# Patient Record
Sex: FEMALE | Race: ASIAN | NOT HISPANIC OR LATINO
[De-identification: names, ages, dates, MRNs, and addresses within clinical notes are randomized per-mention and may not be internally consistent; named-entity substitution may affect disease eponyms.]

---

## 2017-08-12 ENCOUNTER — HEALTH MAINTENANCE LETTER (OUTPATIENT)
Age: 42
End: 2017-08-12

## 2018-01-16 ENCOUNTER — OFFICE VISIT (OUTPATIENT)
Dept: FAMILY MEDICINE | Facility: CLINIC | Age: 43
End: 2018-01-16
Payer: COMMERCIAL

## 2018-01-16 VITALS
SYSTOLIC BLOOD PRESSURE: 100 MMHG | HEART RATE: 74 BPM | WEIGHT: 109.4 LBS | RESPIRATION RATE: 24 BRPM | TEMPERATURE: 98.2 F | OXYGEN SATURATION: 98 % | BODY MASS INDEX: 22.1 KG/M2 | DIASTOLIC BLOOD PRESSURE: 68 MMHG

## 2018-01-16 DIAGNOSIS — S76.812A STRAIN OF ILIOPSOAS MUSCLE, LEFT, INITIAL ENCOUNTER: Primary | ICD-10-CM

## 2018-01-16 RX ORDER — NAPROXEN 500 MG/1
500 TABLET ORAL 2 TIMES DAILY PRN
Qty: 30 TABLET | Refills: 1 | Status: SHIPPED | OUTPATIENT
Start: 2018-01-16 | End: 2022-06-08

## 2018-01-16 RX ORDER — CYCLOBENZAPRINE HCL 10 MG
TABLET ORAL
Qty: 30 TABLET | Refills: 0 | Status: SHIPPED | OUTPATIENT
Start: 2018-01-16 | End: 2022-06-08

## 2018-01-16 NOTE — MR AVS SNAPSHOT
After Visit Summary   1/16/2018    Shawna Otto    MRN: [id number]           Patient Information     Date Of Birth          1975        Visit Information        Provider Department      1/16/2018 4:10 PM David Herrmann DO [facility]        Today's Diagnoses     Strain of iliopsoas muscle, left, initial encounter    -  1    Sprain of neck, initial encounter          Care Instructions      Exercises to Strengthen Your Lower Back  Strong lower back and abdominal muscles work together to support your spine. The exercises below will help strengthen the lower back. It is important that you begin exercising slowly and increase levels gradually.  Always begin any exercise program with stretching. If you feel pain while doing any of these exercises, stop and talk to your doctor about a more specific exercise program that better suits your condition.   Low back stretch  The point of stretching is to make you more flexible and increase your range of motion. Stretch only as much as you are able. Stretch slowly. Do not push your stretch to the limit. If at any point you feel pain while stretching, this is your (temporary) limit.    Lie on your back with your knees bent and both feet on the ground.    Slowly raise your left knee to your chest as you flatten your lower back against the floor. Hold for 5 seconds.    Relax and repeat the exercise with your right knee.    Do 10 of these exercises for each leg.    Repeat hugging both knees to your chest at the same time.  Building lower back strength  Start your exercise routine with 10 to 30 minutes a day, 1 to 3 times a day.  Initial exercises  Lying on your back:  1. Ankle pumps: Move your foot up and down, towards your head, and then away. Repeat 10 times with each foot.  2. Heel slides: Slowly bend your knee, drawing the heel of your foot towards you. Then slide your heel/foot from you, straightening your knee. Do not lift your foot off the floor (this is not  a leg lift).  3. Abdominal contraction: Bend your knees and put your hands on your stomach. Tighten your stomach muscles. Hold for 5 seconds, then relax. Repeat 10 times.  4. Straight leg raise: Bend one leg at the knee and keep the other leg straight. Tighten your stomach muscles. Slowly lift your straight leg 6 to 12 inches off the floor and hold for up to 5 seconds. Repeat 10 times on each side.  Standin. Wall squats: Stand with your back against the wall. Move your feet about 12 inches away from the wall. Tighten your stomach muscles, and slowly bend your knees until they are at about a 45 degree angle. Do not go down too far. Hold about 5 seconds. Then slowly return to your starting position. Repeat 10 times.  2. Heel raises: Stand facing the wall. Slowly raise the heels of your feet up and down, while keeping your toes on the floor. If you have trouble balancing, you can touch the wall with your hands. Repeat 10 times.  More advanced exercises  When you feel comfortable enough, try these exercises.  1. Kneeling lumbar extension: Begin on your hands and knees. At the same time, raise and straighten your right arm and left leg until they are parallel to the ground. Hold for 2 seconds and come back slowly to a starting position. Repeat with left arm and right leg, alternating 10 times.  2. Prone lumbar extension: Lie face down, arms extended overhead, palms on the floor. At the same time, raise your right arm and left leg as high as comfortably possible. Hold for 10 seconds and slowly return to start. Repeat with left arm and right leg, alternating 10 times. Gradually build up to 20 times. (Advanced: Repeat this exercise raising both arms and both legs a few inches off the floor at the same time. Hold for 5 seconds and release.)  3. Pelvic tilt: Lie on the floor on your back with your knees bent at 90 degrees. Your feet should be flat on the floor. Inhale, exhale, then slowly contract your abdominal muscles  bringing your navel toward your spine. Let your pelvis rock back until your lower back is flat on the floor. Hold for 10 seconds while breathing smoothly.  4. Abdominal crunch: Perform a pelvic tilt (above) flattening your lower back against the floor. Holding the tension in your abdominal muscles, take another breath and raise your shoulder blades off the ground (this is not a full sit-up). Keep your head in line with your body (don t bend your neck forward). Hold for 2 seconds, then slowly lower.  Date Last Reviewed: 6/1/2016 2000-2017 [employer]. [address] [place] [address]. All rights reserved. This information is not intended as a substitute for professional medical care. Always follow your healthcare professional's instructions.                Follow-ups after your visit        Who to contact     Please call your clinic at [phone] to:    Ask questions about your health    Make or cancel appointments    Discuss your medicines    Learn about your test results    Speak to your doctor   If you have compliments or concerns about an experience at your clinic, or if you wish to file a complaint, please contact [facility] Patient Relations at [phone] or email us at [email or web address]         Additional Information About Your Visit        [employer] Information     [employer] gives you secure access to your electronic health record. If you see a primary care provider, you can also send messages to your care team and make appointments. If you have questions, please call your primary care clinic.  If you do not have a primary care provider, please call [phone] and they will assist you.      [employer] is an electronic gateway that provides easy, online access to your medical records. With [employer], you can request a clinic appointment, read your test results, renew a prescription or communicate with your care team.     To access your existing  account, please contact your [facility] or call [phone] for assistance.        Care EveryWhere ID     This is your Care EveryWhere ID. This could be used by other organizations to access your [place] medical records  [id number]        Your Vitals Were     Pulse Temperature Respirations Last Period Pulse Oximetry BMI (Body Mass Index)    74 98.2  F (36.8  C) (Oral) 24 01/03/2018 (Approximate) 98% 22.1 kg/m2       Blood Pressure from Last 3 Encounters:   01/16/18 100/68   01/26/16 119/77   12/21/15 107/72    Weight from Last 3 Encounters:   01/16/18 109 lb 6.4 oz (49.6 kg)   12/21/15 103 lb 6.4 oz (46.9 kg)   12/16/15 104 lb 6.4 oz (47.4 kg)              Today, you had the following     No orders found for display         Today's Medication Changes          These changes are accurate as of: 1/16/18  4:29 PM.  If you have any questions, ask your nurse or doctor.               Start taking these medicines.        Dose/Directions    naproxen 500 MG tablet   Commonly known as:  NAPROSYN   Used for:  Strain of iliopsoas muscle, left, initial encounter   Started by:  David Herrmann DO        Dose:  500 mg   Take 1 tablet (500 mg) by mouth 2 times daily as needed for moderate pain   Quantity:  30 tablet   Refills:  1         These medicines have changed or have updated prescriptions.        Dose/Directions    cyclobenzaprine 10 MG tablet   Commonly known as:  FLEXERIL   This may have changed:  additional instructions   Used for:  Strain of iliopsoas muscle, left, initial encounter   Changed by:  David Herrmann DO        1 po qhs, then 1/2 tab po q 6-8 hours during the day for back pain   Quantity:  30 tablet   Refills:  0            Where to get your medicines      [facility] [place], [place] - [address], [place] [address]     Phone:  [phone]     cyclobenzaprine 10 MG tablet    naproxen 500 MG tablet                 Primary Care Provider Office Phone # Fax #    Bartolome Calhoun [phone]       [address] [facility] [address]        Equal Access to Services     LINDA GIL : Hadii angel ku hadcharleyo Sobennieali, waaxda luqadaha, qaybta kaalmada adeegyada, nabil geiger laLuiseitan morales. So [facility] [phone].    ATENCIÓN: Si habla español, tiene a lowe disposición servicios gratuitos de asistencia lingüística. Llame al [phone].    We comply with applicable federal civil rights laws and [place] laws. We do not discriminate on the basis of race, color, national origin, age, disability, sex, sexual orientation, or gender identity.            Thank you!     Thank you for choosing [facility]  for your care. Our goal is always to provide you with excellent care. Hearing back from our patients is one way we can continue to improve our services. Please take a few minutes to complete the written survey that you may receive in the mail after your visit with us. Thank you!             Your Updated Medication List - Protect others around you: Learn how to safely use, store and throw away your medicines at www.disposemymeds.org.          This list is accurate as of: 1/16/18  4:29 PM.  Always use your most recent med list.                   Brand Name Dispense Instructions for use Diagnosis    cyclobenzaprine 10 MG tablet    FLEXERIL    30 tablet    1 po qhs, then 1/2 tab po q 6-8 hours during the day for back pain    Strain of iliopsoas muscle, left, initial encounter       naproxen 500 MG tablet    NAPROSYN    30 tablet    Take 1 tablet (500 mg) by mouth 2 times daily as needed for moderate pain    Strain of iliopsoas muscle, left, initial encounter       SUMAtriptan 50 MG tablet    IMITREX    9 tablet    Take 1 tablet (50 mg) by mouth at onset of headache for migraine May repeat dose in 2 hours.  Do not exceed 200 mg in 24 hours    Migraine without aura and without status migrainosus, not  intractable

## 2018-01-16 NOTE — PROGRESS NOTES
Family History   Problem Relation Age of Onset     CANCER Mother      Liver cancer      Myocardial Infarction Father      HEART DISEASE Father      DIABETES No family hx of      Coronary Artery Disease No family hx of      Social History     Social History     Marital status:      Spouse name: N/A     Number of children: N/A     Years of education: N/A     Social History Main Topics     Smoking status: Never Smoker     Smokeless tobacco: Never Used     Alcohol use No     Drug use: No     Sexual activity: Not Asked     Other Topics Concern     None     Social History Narrative       There are no exam notes on file for this visit.  Chief Complaint   Patient presents with     Back Pain     have lower back pain, this is the third time, had once before Christmas, another one during Christmas, and this time started on Sunday 01/14/2018 per patient.        Blood pressure 100/68, pulse 74, temperature 98.2  F (36.8  C), temperature source Oral, resp. rate 24, weight 109 lb 6.4 oz (49.6 kg), last menstrual period 01/03/2018, SpO2 98 %.      S:  Patient reports back pain started about 2 weeks before christmas, happened 2 more times after that. Inciting event is none- patient woke up with. Described as sharp pain in back. Pain is intermittent. Excerbated by walking, relieved with sitting. No Numbness, no tingling, no incontinence, and no radiation. Sensation is intact in groin area. 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    O:  /68  Pulse 74  Temp 98.2  F (36.8  C) (Oral)  Resp 24  Wt 109 lb 6.4 oz (49.6 kg)  LMP 01/03/2018 (Approximate)  SpO2 98%  BMI 22.1 kg/m2    General Appearance: healthy ,alert, active, no distress  Head/Neck: Normocephalic. No masses, lesions, tenderness or abnormalities  Eyes: conjunctiva clear, PERRL,EOM intact  Ears: External ears normal. Canals  clear. TM's normal.  Heart: regular rate and rhythm with normal S1, S2; no murmur, rub or gallops  Lungs: clear to auscultation, with normal respiratory effort  Extremities: no peripheral edema, peripheral pulses normal  Back:Stooped Gait, Full active ROM. Pain with extension, no bony point tenderness. Tender to deep palpation on L1-L2 Paraspinal muscles, Negative straight leg, tosha. Sensation intact, Strength 5/5, Patellar reflex 2/4. No CVA tenderness  Mental Status: cooperative, normal affect, no gross thought process defects during casual conversation.    A/P:  1. Strain of iliopsoas muscle, left, initial encounter  Likely iliopsoas strain. no worrisome symptoms. Discussed when to call us or go to ED including increased pain, numbness, tingling, rash. Unlikely to be meningitis, fracture, spondy although on differential. Rx flexaril, naproxen. Discussed PT but patient declined. F/u in 2 weeks or sooner if worsening pain.  - cyclobenzaprine (FLEXERIL) 10 MG tablet; 1 po qhs, then 1/2 tab po q 6-8 hours during the day for back pain  Dispense: 30 tablet; Refill: 0  - naproxen (NAPROSYN) 500 MG tablet; Take 1 tablet (500 mg) by mouth 2 times daily as needed for moderate pain  Dispense: 30 tablet; Refill: 1  - If persists in 2 weeks, can refer to PT    David Herrmann  Family Medicine Resident PGY3

## 2018-01-16 NOTE — PATIENT INSTRUCTIONS
Exercises to Strengthen Your Lower Back  Strong lower back and abdominal muscles work together to support your spine. The exercises below will help strengthen the lower back. It is important that you begin exercising slowly and increase levels gradually.  Always begin any exercise program with stretching. If you feel pain while doing any of these exercises, stop and talk to your doctor about a more specific exercise program that better suits your condition.   Low back stretch  The point of stretching is to make you more flexible and increase your range of motion. Stretch only as much as you are able. Stretch slowly. Do not push your stretch to the limit. If at any point you feel pain while stretching, this is your (temporary) limit.    Lie on your back with your knees bent and both feet on the ground.    Slowly raise your left knee to your chest as you flatten your lower back against the floor. Hold for 5 seconds.    Relax and repeat the exercise with your right knee.    Do 10 of these exercises for each leg.    Repeat hugging both knees to your chest at the same time.  Building lower back strength  Start your exercise routine with 10 to 30 minutes a day, 1 to 3 times a day.  Initial exercises  Lying on your back:  1. Ankle pumps: Move your foot up and down, towards your head, and then away. Repeat 10 times with each foot.  2. Heel slides: Slowly bend your knee, drawing the heel of your foot towards you. Then slide your heel/foot from you, straightening your knee. Do not lift your foot off the floor (this is not a leg lift).  3. Abdominal contraction: Bend your knees and put your hands on your stomach. Tighten your stomach muscles. Hold for 5 seconds, then relax. Repeat 10 times.  4. Straight leg raise: Bend one leg at the knee and keep the other leg straight. Tighten your stomach muscles. Slowly lift your straight leg 6 to 12 inches off the floor and hold for up to 5 seconds. Repeat 10 times on each  side.  Standin. Wall squats: Stand with your back against the wall. Move your feet about 12 inches away from the wall. Tighten your stomach muscles, and slowly bend your knees until they are at about a 45 degree angle. Do not go down too far. Hold about 5 seconds. Then slowly return to your starting position. Repeat 10 times.  2. Heel raises: Stand facing the wall. Slowly raise the heels of your feet up and down, while keeping your toes on the floor. If you have trouble balancing, you can touch the wall with your hands. Repeat 10 times.  More advanced exercises  When you feel comfortable enough, try these exercises.  1. Kneeling lumbar extension: Begin on your hands and knees. At the same time, raise and straighten your right arm and left leg until they are parallel to the ground. Hold for 2 seconds and come back slowly to a starting position. Repeat with left arm and right leg, alternating 10 times.  2. Prone lumbar extension: Lie face down, arms extended overhead, palms on the floor. At the same time, raise your right arm and left leg as high as comfortably possible. Hold for 10 seconds and slowly return to start. Repeat with left arm and right leg, alternating 10 times. Gradually build up to 20 times. (Advanced: Repeat this exercise raising both arms and both legs a few inches off the floor at the same time. Hold for 5 seconds and release.)  3. Pelvic tilt: Lie on the floor on your back with your knees bent at 90 degrees. Your feet should be flat on the floor. Inhale, exhale, then slowly contract your abdominal muscles bringing your navel toward your spine. Let your pelvis rock back until your lower back is flat on the floor. Hold for 10 seconds while breathing smoothly.  4. Abdominal crunch: Perform a pelvic tilt (above) flattening your lower back against the floor. Holding the tension in your abdominal muscles, take another breath and raise your shoulder blades off the ground (this is not a full sit-up).  Keep your head in line with your body (don t bend your neck forward). Hold for 2 seconds, then slowly lower.  Date Last Reviewed: 6/1/2016 2000-2017 [employer]. [address] [place] [address], [place] [address]. All rights reserved. This information is not intended as a substitute for professional medical care. Always follow your healthcare professional's instructions.

## 2018-01-16 NOTE — PROGRESS NOTES
Preceptor attestation:  Patient seen and discussed with the resident. Assessment and plan reviewed with resident and agreed upon.  Supervising physician: Jarrod Ramon  [facility]

## 2018-01-25 ENCOUNTER — OFFICE VISIT (OUTPATIENT)
Dept: FAMILY MEDICINE | Facility: CLINIC | Age: 43
End: 2018-01-25
Payer: COMMERCIAL

## 2018-01-25 VITALS
TEMPERATURE: 98.3 F | BODY MASS INDEX: 21.57 KG/M2 | SYSTOLIC BLOOD PRESSURE: 103 MMHG | WEIGHT: 106.8 LBS | OXYGEN SATURATION: 99 % | HEART RATE: 76 BPM | DIASTOLIC BLOOD PRESSURE: 71 MMHG

## 2018-01-25 DIAGNOSIS — N88.8 NABOTHIAN CYST: ICD-10-CM

## 2018-01-25 DIAGNOSIS — Z00.00 ROUTINE HEALTH MAINTENANCE: Primary | ICD-10-CM

## 2018-01-25 NOTE — MR AVS SNAPSHOT
After Visit Summary   1/25/2018    Shawna Otto    MRN: [id number]           Patient Information     Date Of Birth          1975        Visit Information        Provider Department      1/25/2018 4:10 PM Irina De Los Santos MD [facility]        Today's Diagnoses     Routine health maintenance    -  1    Nabothian cyst          Care Instructions    Nabothian cysts -- Nabothian cysts (also called mucinous retention cysts, epithelial inclusion cysts) are discrete cystic structures that form when a cleft of columnar epithelium becomes covered with squamous cells and the columnar cells continue to secrete mucoid material. The cysts vary from microscopic to several centimeters in size; the larger ones project above the surface of the portio. They may appear translucent or opaque. Nabothian cysts may occur following minor trauma or childbirth.  The only indication for treatment is relief from pain or a bothersome feeling of fullness in the vagina. Ablation of the cyst using electrocautery is the usual approach; however, if the diagnosis is uncertain, excision to evaluate histopathology is advised. The main disadvantage to surgical treatment is the possibility of causing scar tissue, which itself can lead to dyspareunia.            Follow-ups after your visit        Follow-up notes from your care team     Return in about 1 year (around 1/25/2019) for Physical Exam.      Who to contact     Please call your clinic at [phone] to:    Ask questions about your health    Make or cancel appointments    Discuss your medicines    Learn about your test results    Speak to your doctor   If you have compliments or concerns about an experience at your clinic, or if you wish to file a complaint, please contact [facility] Patient Relations at [phone] or email us at [email or web address]         Additional Information About Your Visit        MyChart Information      [employer] gives you secure access to your electronic health record. If you see a primary care provider, you can also send messages to your care team and make appointments. If you have questions, please call your primary care clinic.  If you do not have a primary care provider, please call [phone] and they will assist you.      [employer] is an electronic gateway that provides easy, online access to your medical records. With [employer], you can request a clinic appointment, read your test results, renew a prescription or communicate with your care team.     To access your existing account, please contact your [facility] or call [phone] for assistance.        Care EveryWhere ID     This is your Care EveryWhere ID. This could be used by other organizations to access your [place] medical records  [id number]        Your Vitals Were     Pulse Temperature Last Period Pulse Oximetry BMI (Body Mass Index)       76 98.3  F (36.8  C) (Oral) 01/03/2018 (Approximate) 99% 21.57 kg/m2        Blood Pressure from Last 3 Encounters:   01/25/18 103/71   01/16/18 100/68   01/26/16 119/77    Weight from Last 3 Encounters:   01/25/18 106 lb 12.8 oz (48.4 kg)   01/16/18 109 lb 6.4 oz (49.6 kg)   12/21/15 103 lb 6.4 oz (46.9 kg)              We Performed the Following     GYN Cytology ([facility])        Primary Care Provider Office Phone # Fax #    Bartolome Haile Lj,  [phone]       [address] [place] [address]        Equal Access to Services     LINDA GIL : Hadii aad ku hadasho Soomaali, waaxda luqadaha, qaybta kaalmada adeegyada, nabil morales . So [facility] [phone].    ATENCIÓN: Si habla español, tiene a lowe disposición servicios gratuitos de asistencia lingüística. Llame al [phone].    We comply with applicable federal civil rights laws and [place] laws. We do not discriminate on the basis of race, color, national origin, age,  disability, sex, sexual orientation, or gender identity.            Thank you!     Thank you for choosing [facility]  for your care. Our goal is always to provide you with excellent care. Hearing back from our patients is one way we can continue to improve our services. Please take a few minutes to complete the written survey that you may receive in the mail after your visit with us. Thank you!             Your Updated Medication List - Protect others around you: Learn how to safely use, store and throw away your medicines at www.disposemymeds.org.          This list is accurate as of 1/25/18  4:46 PM.  Always use your most recent med list.                   Brand Name Dispense Instructions for use Diagnosis    cyclobenzaprine 10 MG tablet    FLEXERIL    30 tablet    1 po qhs, then 1/2 tab po q 6-8 hours during the day for back pain    Strain of iliopsoas muscle, left, initial encounter       naproxen 500 MG tablet    NAPROSYN    30 tablet    Take 1 tablet (500 mg) by mouth 2 times daily as needed for moderate pain    Strain of iliopsoas muscle, left, initial encounter

## 2018-01-25 NOTE — PROGRESS NOTES
Preceptor attestation:  Patient seen and discussed with the resident. Assessment and plan reviewed with resident and agreed upon.  Was asked to look at cervix--2 nabothian cysts seen at 10 and 11 o'clock.  Supervising physician: Esau Larsen  [facility]

## 2018-01-25 NOTE — LETTER
January 31, 2018      May Yang [address] [place] [address]        Dear May,    Thanks for coming to [facility]! Your pap smear was normal. You are due for your next pap smear in 5 years (1/25/2023) per routine guidelines. Please call with any questions.     Sincerely,     Irina De Los Santos MD     Please see below for your test results.    Resulted Orders   GYN Cytology ([facility])   Result Value Ref Range    Lab AP Case Report SEE RESULTS BELOW       Comment:      Gynecologic Cytology Report                       Case: [id number]                                     Authorizing Provider:  Irina De Los Santos MD       Collected:             01/25/2018 1648              First Screen:          FRANK Nuno   Received:              01/26/2018 0933                                     (ASCP)                                                                         Rescreen:              FRANK Chester                                                                                  (ASCP)                                                                         Specimen:    SUREPATH PAP, SCREENING, Endocervical/cervical                                               Lab AP Gyn Interpretation SEE RESULTS BELOW       Comment:      Negative for squamous intraepithelial lesion or malignancy  Electronically signed by FRANK Chester (ASCP) on 1/30/2018 at  3:11 PM      Lab AP Malignant? Normal Normal    Specimen adequacy:       Satisfactory for evaluation, endocervical/transformation zone component absent    HPV Reflex? Yes regardless of result     High Risk? No     Last Mens Period 12/17/17     Lab AP Abnormal Bleeding No     Lab AP Patient Status tubal ligation     Lab AP Birth Control/Hormones None     Lab AP Previous Normal unknown     Lab AP Previous Abnl none     Lab AP Cervical Appearance nabtholin cyst at 11 o'clock     Narrative    Test performed by:  [facility]  [address] [place] [address]   HPV Burt PCR ([employer])   Result Value Ref Range    HPV Source SurePath     HPV 16 DNA Negative NEG    HPV 18 DNA Negative NEG    Other HR HPV Negative NEG    Final Diagnosis SEE NOTES       Comment:      This patient's sample is negative for HPV DNA.  This test was developed and its performance characteristics determined by the  [facility], Molecular Diagnostics Laboratory. It  has not been cleared or approved by the FDA. The laboratory is regulated under  CLIA as qualified to perform high-complexity testing. This test is used for  clinical purposes. It should not be regarded as investigational or for  research.  (Note)  METHODOLOGY:  The Roche gerald 4800 system uses automated extraction,  simultaneous amplification of HPV (L1 region) and beta-globin,  followed by  real time detection of fluorescent labeled HPV and beta  globin using specific oligonucleotide probes . The test specifically  identifies types HPV 16 DNA and HPV 18 DNA while concurrently  detecting the rest of the high risk types (31, 33, 35, 39, 45, 51,  52, 56, 58, 59, 66 or 68).     COMMENTS:  This test is not intended for use as a screening device  for women under age 30 with normal cervical   cytology.  Results should  be correlated with cytologic and histologic findings. Close clinical  followup is recommended.         Specimen Description Cervical Cells       Comment:      PERFORMED AT  [facility] [address] [address]       Narrative    Test performed by:  [facility]  [address], [place] [address]

## 2018-01-25 NOTE — PATIENT INSTRUCTIONS
Nabothian cysts -- Nabothian cysts (also called mucinous retention cysts, epithelial inclusion cysts) are discrete cystic structures that form when a cleft of columnar epithelium becomes covered with squamous cells and the columnar cells continue to secrete mucoid material. The cysts vary from microscopic to several centimeters in size; the larger ones project above the surface of the portio. They may appear translucent or opaque. Nabothian cysts may occur following minor trauma or childbirth.  The only indication for treatment is relief from pain or a bothersome feeling of fullness in the vagina. Ablation of the cyst using electrocautery is the usual approach; however, if the diagnosis is uncertain, excision to evaluate histopathology is advised. The main disadvantage to surgical treatment is the possibility of causing scar tissue, which itself can lead to dyspareunia.

## 2018-01-26 LAB
FINAL DIAGNOSIS: NORMAL
HPV 16 DNA: NEGATIVE
HPV 18 DNA: NEGATIVE
HPV SOURCE: NORMAL
OTHER HR HPV: NEGATIVE
SPECIMEN DESCRIPTION: NORMAL

## 2018-01-26 NOTE — PROGRESS NOTES
[address] [address]  [phone]         KATERIN Otto is a 43 year old  female with a PMH significant for:     Patient Active Problem List   Diagnosis     Nabothian cyst     She presents for pap.    While there are no pap smears listed in our chart, patient reports history of normal pap and does not remember most recent one. She had a tubal ligation. She has regular monthly periods with last period on 1/3/2018. She denies any any vaginal pain, vaginal discharge, abnormal spotting, abdominal pain or dysuria.    Past Medical History:  History reviewed. No pertinent past medical history.    Past Surgical History:  Past Surgical History:   Procedure Laterality Date     TUBAL LIGATION         Family History:  Family History   Problem Relation Age of Onset     CANCER Mother      Liver cancer      Myocardial Infarction Father      HEART DISEASE Father      DIABETES No family hx of      Coronary Artery Disease No family hx of        Social History:  Social History     Social History     Marital status:      Spouse name: N/A     Number of children: N/A     Years of education: N/A     Occupational History     Not on file.     Social History Main Topics     Smoking status: Never Smoker     Smokeless tobacco: Never Used     Alcohol use No     Drug use: No     Sexual activity: Not on file     Other Topics Concern     Not on file     Social History Narrative       Medications:   Current Outpatient Prescriptions   Medication Sig Dispense Refill     cyclobenzaprine (FLEXERIL) 10 MG tablet 1 po qhs, then 1/2 tab po q 6-8 hours during the day for back pain 30 tablet 0     naproxen (NAPROSYN) 500 MG tablet Take 1 tablet (500 mg) by mouth 2 times daily as needed for moderate pain 30 tablet 1       Allergies:     Allergies   Allergen Reactions     Nkda [No Known Drug Allergies]        PMH, Surgical Hx, Family Hx, Social Hx, Medications and Allergies were reviewed and updated as  needed in Epic.        REVIEW OF SYSTEMS     Please see HPI        OBJECTIVE     Vitals:    01/25/18 1551   BP: 103/71   Pulse: 76   Temp: 98.3  F (36.8  C)   TempSrc: Oral   SpO2: 99%   Weight: 106 lb 12.8 oz (48.4 kg)     Body mass index is 21.57 kg/(m^2).    Constitutional: Awake, alert, cooperative, no apparent distress, and appears stated age.  Genitourinary: No urethral discharge, normal external genitalia, no hernia. 2 small 0.5 cm pale pink papule at 10 o'clock and 11 o'clock position of cervix. No other lesions.   Musculoskeletal: No redness, warmth, or swelling of the joints.  Full range of motion noted.  Motor strength is 5 out of 5 all extremities bilaterally.  Tone is normal.  Neurologic: Awake, alert, oriented to name, place and time.    Neuropsychiatric: Normal affect, mood, orientation, memory and insight.  Skin: No rashes, erythema, pallor, petechia or purpura.      ASSESSMENT AND PLAN     Shawna Otto is a 43 year old  female with no signficant PMH who presents for pap smear.    1. Routine health maintenance: Due for pap, no known history of abnormal pap. Would like letter with results.  - GYN Cytology ([facility])    2. Nabothian cyst:  2 small 0.5 cm pale pink papule at 10 o'clock and 11 o'clock position of cervix c/w cyst. Asymptomatic. If becomes symptomatic, can refer to Gyn.    RTC in 1 year for follow up of CPE or sooner if develops new or worsening symptoms.    Options for treatment and/or follow-up care were reviewed with the patient who was engaged and actively involved in the decision making process and verbalized understanding of the options discussed and was satisfied with the final plan.    Irina De Los Santos MD PGY-3  [facility]  Pager: [phone]    Patient discussed with Dr. Ivan Larsen, attending physician, who agrees with the plan.

## 2018-01-30 ENCOUNTER — RECORDS - HEALTHEAST (OUTPATIENT)
Dept: ADMINISTRATIVE | Facility: OTHER | Age: 43
End: 2018-01-30

## 2018-01-30 LAB
CYTOLOGY CVX/VAG DOC THIN PREP: NORMAL
HIGH RISK?: NO
HPV REFLEX?: NORMAL
LAB AP ABNORMAL BLEEDING: NO
LAB AP BIRTH CONTROL/HORMONES: NORMAL
LAB AP CASE REPORT: NORMAL
LAB AP CERVICAL APPEARANCE: NORMAL
LAB AP MALIGNANT?: NORMAL
LAB AP PATIENT STATUS: NORMAL
LAB AP PREVIOUS ABNL: NORMAL
LAB AP PREVIOUS NORMAL: NORMAL
LAST MENS PERIOD: NORMAL
SPECIMEN ADEQUACY:: NORMAL

## 2018-01-31 NOTE — PROGRESS NOTES
Please mail results letter, thanks!    Dear Ms. Otto,    Thanks for coming to [facility]! Your pap smear was normal. You are due for your next pap smear in 5 years (1/25/2023) per routine guidelines. Please call with any questions.    Sincerely,  Irina De Los Santos MD

## 2018-02-23 ENCOUNTER — OFFICE VISIT (OUTPATIENT)
Dept: FAMILY MEDICINE | Facility: CLINIC | Age: 43
End: 2018-02-23
Payer: COMMERCIAL

## 2018-02-23 VITALS
TEMPERATURE: 98.5 F | WEIGHT: 106 LBS | DIASTOLIC BLOOD PRESSURE: 67 MMHG | HEART RATE: 81 BPM | HEIGHT: 58 IN | BODY MASS INDEX: 22.25 KG/M2 | OXYGEN SATURATION: 100 % | SYSTOLIC BLOOD PRESSURE: 99 MMHG

## 2018-02-23 DIAGNOSIS — J06.9 VIRAL URI WITH COUGH: Primary | ICD-10-CM

## 2018-02-23 NOTE — LETTER
February 23, 2018                                                                     To Whom it May Concern:    Shawna Otto attended clinic here on Feb 23, 2018. Please excuse her for this. She may return to work on 2/26/18 without any restrictions.     Sincerely,    Pieter Fisher MD

## 2018-02-23 NOTE — PATIENT INSTRUCTIONS
Thank you for coming to [facility] for your care.     Please be sure to :-  1. Continue to stay hydrated as you have.   2. Take Theraflu and advil as needed to help with your symptoms.   3. Return to clinic in 2 weeks if symptoms don't improve.   4. Call the Clinic or go to the ED if having any worsening and/or concerning symptoms as we discussed.      Pieter Fisher MD    The Flu (Influenza)     The virus that causes the flu spreads through the air in droplets when someone who has the flu coughs, sneezes, laughs, or talks.   The flu (influenza) is an infection that affects your respiratory tract. This tract is made up of your mouth, nose, and lungs, and the passages between them. Unlike a cold, the flu can make you very ill. And it can lead to pneumonia, a serious lung infection. The flu can have serious complications and even cause death.  Who is at risk for the flu?  Anyone can get the flu. But you are more likely to become infected if you:    Have a weakened immune system    Work in a healthcare setting where you may be exposed to flu germs    Live or work with someone who has the flu    Haven t had an annual flu shot  How does the flu spread?  The flu is caused by a virus. The virus spreads through the air in droplets when someone who has the flu coughs, sneezes, laughs, or talks. You can become infected when you inhale these viruses directly. You can also become infected when you touch a surface on which the droplets have landed and then transfer the germs to your eyes, nose, or mouth. Touching used tissues, or sharing utensils, drinking glasses, or a toothbrush from an infected person can expose you to flu viruses, too.  What are the symptoms of the flu?  Flu symptoms tend to come on quickly and may last a few days to a few weeks. They include:    Fever usually higher than 100.4 F  (38 C) and chills    Sore throat and headache    Dry cough    Runny nose    Tiredness and  weakness    Muscle aches  Who is at risk for flu complications?  For some people, the flu can be very serious. The risk for complications is greater for:    Children younger than age 5    Adults ages 65 and older    People with a chronic illness such as diabetes or heart, kidney, or lung disease    People who live in a nursing home or long-term care facility   How is the flu treated?  The flu usually gets better after 7 days or so. In some cases, your healthcare provider may prescribe an antiviral medicine. This may help you get well a little sooner. For the medicine to help, you need to take it as soon as possible (ideally within 48 hours) after your symptoms start. If you develop pneumonia or other serious illness, you may need to stay in the hospital.  Easing flu symptoms    Drink lots of fluids such as water, juice, and warm soup. A good rule is to drink enough so that you urinate your normal amount.    Get plenty of rest.    Ask your healthcare provider what to take for fever and pain.    Call your provider if your fever is 100.4 F (38 C) or higher, or you become dizzy, lightheaded, or short of breath.  Taking steps to protect others    Wash your hands often, especially after coughing or sneezing. Or clean your hands with an alcohol-based hand  containing at least 60% alcohol.    Cough or sneeze into a tissue. Then throw the tissue away and wash your hands. If you don t have a tissue, cough and sneeze into your elbow.    Stay home until at least 24 hours after you no longer have a fever or chills. Be sure the fever isn t being hidden by fever-reducing medicine.    Don t share food, utensils, drinking glasses, or a toothbrush with others.    Ask your healthcare provider if others in your household should get antiviral medicine to help them avoid infection.  How can the flu be prevented?    One of the best ways to avoid the flu is to get a flu vaccine each year. The virus that causes the flu changes from  year to year. For that reason, healthcare providers recommend getting the flu vaccine each year, as soon as it's available in your area. The vaccine is given as a shot. Your healthcare provider can tell you which vaccine is right for you. A nasal spray is also available but is not recommended for the 2472-2494 flu season. The CDC says this is because the nasal spray did not seem to protect against the flu over the last several flu seasons. In the past, it was meant for people ages 2 to 49.    Wash your hands often. Frequent handwashing is a proven way to help prevent infection.    Carry an alcohol-based hand gel containing at least 60% alcohol. Use it when you can't use soap and water. Then wash your hands as soon as you can.    Avoid touching your eyes, nose, and mouth.    At home and work, clean phones, computer keyboards, and toys often with disinfectant wipes.    If possible, avoid close contact with others who have the flu or symptoms of the flu.  Handwashing tips  Handwashing is one of the best ways to prevent many common infections. If you are caring for or visiting someone with the flu, wash your hands each time you enter and leave the room. Follow these steps:    Use warm water and plenty of soap. Rub your hands together well.    Clean the whole hand, including under your nails, between your fingers, and up the wrists.    Wash for at least 15 seconds.    Rinse, letting the water run down your fingers, not up your wrists.    Dry your hands well. Use a paper towel to turn off the faucet and open the door.  Using alcohol-based hand   Alcohol-based hand  are also a good choice. Use them when you can't use soap and water. Follow these steps:    Squeeze about a tablespoon of gel into the palm of one hand.    Rub your hands together briskly, cleaning the backs of your hands, the palms, between your fingers, and up the wrists.    Rub until the gel is gone and your hands are completely  dry.  Preventing the flu in healthcare settings  The flu is a special concern for people in hospitals and long-term care facilities. To help prevent the spread of flu, many hospitals and nursing homes take these steps:    Healthcare providers wash their hands or use an alcohol-based hand  before and after treating each patient.    People with the flu have private rooms and bathrooms or share a room with someone with the same infection.    People who are at high risk for the flu but don't have it are encouraged to get the flu and pneumonia vaccines.    All healthcare workers are encouraged or required to get flu shots.   Date Last Reviewed: 12/1/2016 2000-2017 [employer]. [address] [place] [address], [place] [address]. All rights reserved. This information is not intended as a substitute for professional medical care. Always follow your healthcare professional's instructions.

## 2018-02-23 NOTE — MR AVS SNAPSHOT
After Visit Summary   2/23/2018    Shawna Otto    MRN: [id number]           Patient Information     Date Of Birth          1975        Visit Information        Provider Department      2/23/2018 10:40 AM Pieter Fisher MD [facility]        Care Instructions    Thank you for coming to [facility] for your care.     Please be sure to :-  1. Continue to stay hydrated as you have.   2. Take Theraflu and advil as needed to help with your symptoms.   3. Return to clinic in 2 weeks if symptoms don't improve.   4. Call the Clinic or go to the ED if having any worsening and/or concerning symptoms as we discussed.      Pieter Fisher MD    The Flu (Influenza)     The virus that causes the flu spreads through the air in droplets when someone who has the flu coughs, sneezes, laughs, or talks.   The flu (influenza) is an infection that affects your respiratory tract. This tract is made up of your mouth, nose, and lungs, and the passages between them. Unlike a cold, the flu can make you very ill. And it can lead to pneumonia, a serious lung infection. The flu can have serious complications and even cause death.  Who is at risk for the flu?  Anyone can get the flu. But you are more likely to become infected if you:    Have a weakened immune system    Work in a healthcare setting where you may be exposed to flu germs    Live or work with someone who has the flu    Haven t had an annual flu shot  How does the flu spread?  The flu is caused by a virus. The virus spreads through the air in droplets when someone who has the flu coughs, sneezes, laughs, or talks. You can become infected when you inhale these viruses directly. You can also become infected when you touch a surface on which the droplets have landed and then transfer the germs to your eyes, nose, or mouth. Touching used tissues, or sharing utensils, drinking glasses, or a toothbrush from an infected person can expose you to  flu viruses, too.  What are the symptoms of the flu?  Flu symptoms tend to come on quickly and may last a few days to a few weeks. They include:    Fever usually higher than 100.4 F  (38 C) and chills    Sore throat and headache    Dry cough    Runny nose    Tiredness and weakness    Muscle aches  Who is at risk for flu complications?  For some people, the flu can be very serious. The risk for complications is greater for:    Children younger than age 5    Adults ages 65 and older    People with a chronic illness such as diabetes or heart, kidney, or lung disease    People who live in a nursing home or long-term care facility   How is the flu treated?  The flu usually gets better after 7 days or so. In some cases, your healthcare provider may prescribe an antiviral medicine. This may help you get well a little sooner. For the medicine to help, you need to take it as soon as possible (ideally within 48 hours) after your symptoms start. If you develop pneumonia or other serious illness, you may need to stay in the hospital.  Easing flu symptoms    Drink lots of fluids such as water, juice, and warm soup. A good rule is to drink enough so that you urinate your normal amount.    Get plenty of rest.    Ask your healthcare provider what to take for fever and pain.    Call your provider if your fever is 100.4 F (38 C) or higher, or you become dizzy, lightheaded, or short of breath.  Taking steps to protect others    Wash your hands often, especially after coughing or sneezing. Or clean your hands with an alcohol-based hand  containing at least 60% alcohol.    Cough or sneeze into a tissue. Then throw the tissue away and wash your hands. If you don t have a tissue, cough and sneeze into your elbow.    Stay home until at least 24 hours after you no longer have a fever or chills. Be sure the fever isn t being hidden by fever-reducing medicine.    Don t share food, utensils, drinking glasses, or a toothbrush with  others.    Ask your healthcare provider if others in your household should get antiviral medicine to help them avoid infection.  How can the flu be prevented?    One of the best ways to avoid the flu is to get a flu vaccine each year. The virus that causes the flu changes from year to year. For that reason, healthcare providers recommend getting the flu vaccine each year, as soon as it's available in your area. The vaccine is given as a shot. Your healthcare provider can tell you which vaccine is right for you. A nasal spray is also available but is not recommended for the 4654-9304 flu season. The CDC says this is because the nasal spray did not seem to protect against the flu over the last several flu seasons. In the past, it was meant for people ages 2 to 49.    Wash your hands often. Frequent handwashing is a proven way to help prevent infection.    Carry an alcohol-based hand gel containing at least 60% alcohol. Use it when you can't use soap and water. Then wash your hands as soon as you can.    Avoid touching your eyes, nose, and mouth.    At home and work, clean phones, computer keyboards, and toys often with disinfectant wipes.    If possible, avoid close contact with others who have the flu or symptoms of the flu.  Handwashing tips  Handwashing is one of the best ways to prevent many common infections. If you are caring for or visiting someone with the flu, wash your hands each time you enter and leave the room. Follow these steps:    Use warm water and plenty of soap. Rub your hands together well.    Clean the whole hand, including under your nails, between your fingers, and up the wrists.    Wash for at least 15 seconds.    Rinse, letting the water run down your fingers, not up your wrists.    Dry your hands well. Use a paper towel to turn off the faucet and open the door.  Using alcohol-based hand   Alcohol-based hand  are also a good choice. Use them when you can't use soap and water.  Follow these steps:    Squeeze about a tablespoon of gel into the palm of one hand.    Rub your hands together briskly, cleaning the backs of your hands, the palms, between your fingers, and up the wrists.    Rub until the gel is gone and your hands are completely dry.  Preventing the flu in healthcare settings  The flu is a special concern for people in hospitals and long-term care facilities. To help prevent the spread of flu, many hospitals and nursing homes take these steps:    Healthcare providers wash their hands or use an alcohol-based hand  before and after treating each patient.    People with the flu have private rooms and bathrooms or share a room with someone with the same infection.    People who are at high risk for the flu but don't have it are encouraged to get the flu and pneumonia vaccines.    All healthcare workers are encouraged or required to get flu shots.   Date Last Reviewed: 12/1/2016 2000-2017 [employer]. [address] [place] [address]. All rights reserved. This information is not intended as a substitute for professional medical care. Always follow your healthcare professional's instructions.                Follow-ups after your visit        Follow-up notes from your care team     Return in about 2 weeks (around 3/9/2018), or if symptoms worsen or fail to improve.      Who to contact     Please call your clinic at [phone] to:    Ask questions about your health    Make or cancel appointments    Discuss your medicines    Learn about your test results    Speak to your doctor            Additional Information About Your Visit        MyChart Information     [employer] gives you secure access to your electronic health record. If you see a primary care provider, you can also send messages to your care team and make appointments. If you have questions, please call your primary care clinic.  If you do not have a primary care provider, please call [phone]  "and they will assist you.      [employer] is an electronic gateway that provides easy, online access to your medical records. With [employer], you can request a clinic appointment, read your test results, renew a prescription or communicate with your care team.     To access your existing account, please contact your [facility] or call [phone] for assistance.        Care EveryWhere ID     This is your Care EveryWhere ID. This could be used by other organizations to access your [facility] medical records  [id number]        Your Vitals Were     Pulse Temperature Height Pulse Oximetry BMI (Body Mass Index)       81 98.5  F (36.9  C) 4' 10.25\" (148 cm) 100% 21.96 kg/m2        Blood Pressure from Last 3 Encounters:   02/23/18 99/67   01/25/18 103/71   01/16/18 100/68    Weight from Last 3 Encounters:   02/23/18 106 lb (48.1 kg)   01/25/18 106 lb 12.8 oz (48.4 kg)   01/16/18 109 lb 6.4 oz (49.6 kg)              Today, you had the following     No orders found for display       Primary Care Provider Office Phone # Fax #    Bartolome Calhoun, [phone]       [address] [facility] [address]        Equal Access to Services     LINDA GIL : Hadii angel ku hadasho Soomaali, waaxda luqadaha, qaybta kaalmada adeegyada, waxay idiin haymartan lilian morales. So [facility] [phone].    ATENCIÓN: Si habla español, tiene a lowe disposición servicios gratuitos de asistencia lingüística. Llame al [phone].    We comply with applicable federal civil rights laws and [place] laws. We do not discriminate on the basis of race, color, national origin, age, disability, sex, sexual orientation, or gender identity.            Thank you!     Thank you for choosing [facility]  for your care. Our goal is always to provide you with excellent care. Hearing back from our patients is one way we can continue to improve our services. Please take a few minutes to complete the written " survey that you may receive in the mail after your visit with us. Thank you!             Your Updated Medication List - Protect others around you: Learn how to safely use, store and throw away your medicines at www.disposemymeds.org.          This list is accurate as of 2/23/18 11:26 AM.  Always use your most recent med list.                   Brand Name Dispense Instructions for use Diagnosis    cyclobenzaprine 10 MG tablet    FLEXERIL    30 tablet    1 po qhs, then 1/2 tab po q 6-8 hours during the day for back pain    Strain of iliopsoas muscle, left, initial encounter       naproxen 500 MG tablet    NAPROSYN    30 tablet    Take 1 tablet (500 mg) by mouth 2 times daily as needed for moderate pain    Strain of iliopsoas muscle, left, initial encounter

## 2018-02-23 NOTE — PROGRESS NOTES
Preceptor attestation:  Patient seen and discussed with the resident. Assessment and plan reviewed with resident and agreed upon.  Supervising physician: Esau Larsen  [facility]

## 2018-02-23 NOTE — PROGRESS NOTES
"       KATERIN Otto is a 43 year old female with a PMH significant for   Patient Active Problem List   Diagnosis     Nabothian cyst    who presents for evaluation of possible cold symptoms.    Patient's symptoms started about 4 days ago. She has since had subjective fevers, chills, body aches and has had to intermittently miss work due to this. She has also had a mildly productive cough, watery eyes, ear ache and headache associated with this. Her  had been sick with similar symptoms but now is fully recovered. She has 4 children living at home the youngest of whom is 12 y.o. She also has a granddaughter but she doesn't see her much.     Patient has been missing work due to her symptoms and also because she works as a  at a local school. She would like a note fort this.     Patient speaks English, so an  was not used.    Medications and problem list have been reviewed and updated.         REVIEW OF SYSTEMS     General: Positive for fevers and chills  Head: Positive for headache, no dizziness  Neck: No swallowing problems   Resp: Positive for mildly productive cough, congestion and coryza.   GI: No constipation, diarrhea, no nausea or vomiting  Skin: No rash        OBJECTIVE     Vitals:    02/23/18 1036   BP: 99/67   Pulse: 81   Temp: 98.5  F (36.9  C)   SpO2: 100%   Weight: 106 lb (48.1 kg)   Height: 4' 10.25\" (148 cm)     Body mass index is 21.96 kg/(m^2).    Gen:  In NAD but ill appearing. Good color.   HEENT: PERRLA slight conjunctival injection and watery eyes noted. Ear canal notably erythematous. No oropharyngeal erythema, tonsillar swelling or exudates noted, oropharynx pink and moist. nasopharynx pink and moist.  Neck: Supple without lymphadenopathy  CV:  RRR  - no murmurs, age appropriate rate  Pulm:  CTAB, no wheezes/rales/rhonchi, good air entry   Abdomen: soft, nontender, no masses, no rebound, BS intact throughout  Skin: No rashes or lesions noted.    No " results found for this or any previous visit (from the past 24 hour(s)).    ASSESSMENT AND PLAN     May was seen today for fever and cough.    Diagnoses and all orders for this visit:    Viral URI with cough  4 days hx of classic flu symptoms likely secondary to influenza. She has not had the flu shot this year and meet got this from her  who was recently sick. She is past the period to treat with Tamiflu and so will not swab for this today. Children are all >5 and there are no other vulnerable family members living at home. Discussed timeline of symptoms.  - Continue good fluid hydration  - Theraflu and Advil as needed to help with symptoms  - Printed a note for work.   - Good hygiene and handwashing discussed.     Options for treatment and/or follow-up care were reviewed with the patient who was engaged and actively involved in the decision making process and verbalized understanding of the options discussed and was satisfied with the final plan.    I discussed the patient's findings, assessment and plan with attending physician Dr. Esau Larsen who was agreeable with plan.    Pieter Fisher, PGY1

## 2019-11-05 ENCOUNTER — HEALTH MAINTENANCE LETTER (OUTPATIENT)
Age: 44
End: 2019-11-05

## 2020-11-22 ENCOUNTER — HEALTH MAINTENANCE LETTER (OUTPATIENT)
Age: 45
End: 2020-11-22

## 2021-05-26 ENCOUNTER — RECORDS - HEALTHEAST (OUTPATIENT)
Dept: ADMINISTRATIVE | Facility: CLINIC | Age: 46
End: 2021-05-26

## 2021-09-19 ENCOUNTER — HEALTH MAINTENANCE LETTER (OUTPATIENT)
Age: 46
End: 2021-09-19

## 2022-01-09 ENCOUNTER — HEALTH MAINTENANCE LETTER (OUTPATIENT)
Age: 47
End: 2022-01-09

## 2022-03-06 ENCOUNTER — HEALTH MAINTENANCE LETTER (OUTPATIENT)
Age: 47
End: 2022-03-06

## 2022-06-08 ENCOUNTER — OFFICE VISIT (OUTPATIENT)
Dept: FAMILY MEDICINE | Facility: CLINIC | Age: 47
End: 2022-06-08
Payer: COMMERCIAL

## 2022-06-08 VITALS
BODY MASS INDEX: 24.04 KG/M2 | RESPIRATION RATE: 16 BRPM | WEIGHT: 116 LBS | OXYGEN SATURATION: 97 % | TEMPERATURE: 97.8 F | DIASTOLIC BLOOD PRESSURE: 72 MMHG | SYSTOLIC BLOOD PRESSURE: 104 MMHG | HEART RATE: 71 BPM

## 2022-06-08 DIAGNOSIS — R10.13 DYSPEPSIA: ICD-10-CM

## 2022-06-08 DIAGNOSIS — R05.9 COUGH: Primary | ICD-10-CM

## 2022-06-08 DIAGNOSIS — R09.82 POST-NASAL DRIP: ICD-10-CM

## 2022-06-08 LAB — SARS-COV-2 RNA RESP QL NAA+PROBE: POSITIVE

## 2022-06-08 PROCEDURE — U0003 INFECTIOUS AGENT DETECTION BY NUCLEIC ACID (DNA OR RNA); SEVERE ACUTE RESPIRATORY SYNDROME CORONAVIRUS 2 (SARS-COV-2) (CORONAVIRUS DISEASE [COVID-19]), AMPLIFIED PROBE TECHNIQUE, MAKING USE OF HIGH THROUGHPUT TECHNOLOGIES AS DESCRIBED BY CMS-2020-01-R: HCPCS | Performed by: STUDENT IN AN ORGANIZED HEALTH CARE EDUCATION/TRAINING PROGRAM

## 2022-06-08 PROCEDURE — U0005 INFEC AGEN DETEC AMPLI PROBE: HCPCS | Performed by: STUDENT IN AN ORGANIZED HEALTH CARE EDUCATION/TRAINING PROGRAM

## 2022-06-08 PROCEDURE — 99203 OFFICE O/P NEW LOW 30 MIN: CPT | Mod: CS | Performed by: STUDENT IN AN ORGANIZED HEALTH CARE EDUCATION/TRAINING PROGRAM

## 2022-06-08 RX ORDER — FLUTICASONE PROPIONATE 50 MCG
1 SPRAY, SUSPENSION (ML) NASAL DAILY
Qty: 11.1 ML | Refills: 1 | Status: SHIPPED | OUTPATIENT
Start: 2022-06-08 | End: 2023-02-27

## 2022-06-08 RX ORDER — FAMOTIDINE 20 MG/1
20 TABLET, FILM COATED ORAL 2 TIMES DAILY
Qty: 60 TABLET | Refills: 3 | Status: SHIPPED | OUTPATIENT
Start: 2022-06-08 | End: 2023-02-27

## 2022-06-08 NOTE — PROGRESS NOTES
Preceptor Attestation:    I discussed the patient with the resident and evaluated the patient in person. I have verified the content of the note, which accurately reflects my assessment of the patient and the plan of care.   Supervising Physician:  Gary Hitchcock MD.

## 2022-06-08 NOTE — PROGRESS NOTES
Assessment & Plan     Cough  3 day history of cough. Patient describes some cold like symptoms in the spring which is c/w allergies. Patient also describes history of heart burn (see below). Both of these could be contributing to this cough.   - Symptomatic; Unknown COVID-19 Virus (Coronavirus) by PCR Nose    Dyspepsia  History of GERD, has tried omeprazole in the past.  - famotidine (PEPCID) 20 MG tablet; Take 1 tablet (20 mg) by mouth 2 times daily    Post-nasal drip  Patient's description of spring time cough that is worse in the morning is c/w allergies.   - fluticasone (FLONASE) 50 MCG/ACT nasal spray; Spray 1 spray into both nostrils daily    Patient Instructions   Nice to meet you today! I recommend coming in for a complete physical when able to review preventative health needs.      Start Famotidine 20 mg two times daily. If no improvement in stomach symptoms in 2-4 weeks we should try omeprazole again.    Use 1 spray of Flonase daily in both nostrils.      Prince Ohara  MS4 Medical Student     Resident/Fellow Attestation   I, Becca Douglas MD, was present with the medical/AMBER student who participated in the service and in the documentation of the note.  I have verified the history and personally performed the physical exam and medical decision making.  I agree with the assessment and plan of care as documented in the note.      Becca Douglas MD PGY3      Subjective   May is a 47 year old who presents for the following health issues: 3 day history of dry cough with occasional phlegm. Patient denies sputum production. Patient is around her two grandchildren and wants to make sure that she is healthy. Patient feels that every spring she gets a cough but that this usually has accompanying cold symptoms. Patient has had some allergies in the past. No cough or CP. No edema. No palpitations. Does have a history of GERD  But no longer taking PPI.    Review of Systems  "  Constitutional: No fevers or chills, no fatigue  HEENT: no nasal dyscharge, unremarkable oral pharynx w/o lesions   Pulmonary: describes cough worse in the morning, feels this is a deep dry cough, denies sputum production   GI: Patient endorses history of Heart burn and \"sour feeling\" in stomach. Does not describe sensations of regurgitation. No smoking or alcohol use or difficulties swallowing.   Skin: denies new bumps, lumps, rashes      Objective    /72 (BP Location: Left arm, Patient Position: Sitting, Cuff Size: Adult Regular)   Pulse 71   Temp 97.8  F (36.6  C) (Oral)   Resp 16   Wt 52.6 kg (116 lb)   SpO2 97%   BMI 24.04 kg/m    Body mass index is 24.04 kg/m .     Physical Exam   GENERAL: healthy, alert and no distress  EYES: Eyes grossly normal to inspection, PERRL and conjunctivae and sclerae normal  HENT: ear canals and TM's normal, nose and mouth without ulcers or lesions  NECK: no adenopathy, no asymmetry, masses  RESP: lungs clear to auscultation - no rales, rhonchi or wheezes  CV: regular rate and rhythm, normal S1 S2, no S3 or S4, no murmur, click or rub, no peripheral edema and peripheral pulses strong  ABDOMEN: soft, nontender, no hepatosplenomegaly, no masses and bowel sounds normal  MS: no gross musculoskeletal defects noted, no edema  SKIN: no suspicious lesions or rashes  NEURO: Normal strength and tone, mentation intact and speech normal  PSYCH: mentation appears normal, affect normal/bright        "

## 2022-06-08 NOTE — PATIENT INSTRUCTIONS
Nice to meet you today! I recommend coming in for a complete physical when able to review preventative health needs.      Start Famotidine 20 mg two times daily. If no improvement in stomach symptoms in 2-4 weeks we should try omeprazole again.    Use 1 spray of Flonase daily in both nostrils.

## 2022-06-09 ENCOUNTER — TELEPHONE (OUTPATIENT)
Dept: NURSING | Facility: CLINIC | Age: 47
End: 2022-06-09
Payer: COMMERCIAL

## 2022-06-09 ENCOUNTER — TELEPHONE (OUTPATIENT)
Dept: FAMILY MEDICINE | Facility: CLINIC | Age: 47
End: 2022-06-09
Payer: COMMERCIAL

## 2022-06-09 NOTE — TELEPHONE ENCOUNTER
Patient classified as COVID treatment eligible by Epic high risk algorithm:  Yes    Coronavirus (COVID-19) Notification    Reason for call  Notify of POSITIVE COVID-19 lab result, assess symptoms,  review [facility] recommendations    Lab Result   Lab test for 2019-nCoV rRt-PCR or SARS-COV-2 PCR  Oropharyngeal AND/OR nasopharyngeal swabs were POSITIVE for 2019-nCoV RNA [OR] SARS-COV-2 RNA (COVID-19) RNA     We have been unable to reach patient by phone at this time to notify of their Positive COVID-19 result.    UNABLE TO LEAVE A MESSAGE        A Positive COVID-19 letter will be sent via [employer] or the mail. (Exception, no letters sent to Presurgerical/Preprocedure Patients)    Rebeca White

## 2022-06-09 NOTE — RESULT ENCOUNTER NOTE
Results given to RN.  We will call the patient and get her set up for virtual visit this afternoon to assess for possible treatment with Paxlovid, etc.

## 2022-06-09 NOTE — TELEPHONE ENCOUNTER
PCT patient @[phone] and got a busy signal.  Dr. Hitchcock tried calling the patient too and got a busy signal.    Wendy Garcia, DNP, BSN, RN, PHN

## 2022-06-09 NOTE — TELEPHONE ENCOUNTER
Attempted to call patient to schedule virtual covid treatment appt. No answer, but was able to leave message.     Will try again later.     Uma Choi

## 2022-06-10 ENCOUNTER — VIRTUAL VISIT (OUTPATIENT)
Dept: FAMILY MEDICINE | Facility: CLINIC | Age: 47
End: 2022-06-10
Payer: COMMERCIAL

## 2022-06-10 DIAGNOSIS — U07.1 INFECTION DUE TO 2019 NOVEL CORONAVIRUS: Primary | ICD-10-CM

## 2022-06-10 PROCEDURE — 99213 OFFICE O/P EST LOW 20 MIN: CPT | Mod: TEL | Performed by: STUDENT IN AN ORGANIZED HEALTH CARE EDUCATION/TRAINING PROGRAM

## 2022-06-10 NOTE — PROGRESS NOTES
Preceptor attestation:    I discussed the patient with the resident, and I spoke to the patient by telephone. I have verified the content of the note, which accurately reflects my assessment of the patient and the plan of care.    Supervising physician: Nadya Narvaez MD  [facility]

## 2022-06-10 NOTE — PROGRESS NOTES
May is a 47 year old who is being evaluated via a billable telephone visit.      What phone number would you like to be contacted at? [phone]  How would you like to obtain your AVS? MyChart    Assessment & Plan     Infection due to 2019 novel coronavirus  MASSBP risk score of 2.  Symptoms started 5 days ago.  Positive COVID test on 6/8/2022.  Overall symptoms have been mild.  Discussed that she may not experience significant benefit from the antiviral medication as she is already starting to feel better.  She is interested in pursuing treatment.  Reviewed possible side effects.  All questions answered.  - nirmatrelvir and ritonavir (PAXLOVID) therapy pack; Take 3 tablets by mouth 2 times daily Take 2 Nirmatrelvir tablets and 1 Ritonavir tablet twice daily for 5 days.    Patient was seen by and discussed with attending physician, Dr. Narvaez.     Jeni Martínez MD  [facility]    Tin Matos is a 47 year old who presents for the following health issues     HPI     Symptoms started Monday with a scratchy throat. She tested positive for COVID19 on 6/8/22. Has not had any coughing or shortness of breath.  She is feeling quite a bit better today.  She is interested in antiviral medications.      Review of Systems   Constitutional, HEENT, cardiovascular, pulmonary, gi and gu systems are negative, except as otherwise noted.      Objective         Vitals:  No vitals were obtained today due to virtual visit.    Physical Exam   alert and no distress  PSYCH: Alert and oriented times 3; coherent speech, normal   rate and volume, able to articulate logical thoughts, able   to abstract reason, no tangential thoughts, no hallucinations   or delusions  Her affect is normal  RESP: No cough, no audible wheezing, able to talk in full sentences  Remainder of exam unable to be completed due to telephone visits     Latest Reference Range & Units 06/08/22 08:43   SARS CoV2 PCR Negative  Positive !  [1]   !: Data is abnormal  [1] POSITIVE: SARS-CoV-2 (COVID-19) RNA detected, presumed positive.    ----- Service Performed and Documented by Resident or Fellow ------        Phone call duration: 10 minutes

## 2022-11-21 ENCOUNTER — HEALTH MAINTENANCE LETTER (OUTPATIENT)
Age: 47
End: 2022-11-21

## 2023-02-27 ENCOUNTER — OFFICE VISIT (OUTPATIENT)
Dept: FAMILY MEDICINE | Facility: CLINIC | Age: 48
End: 2023-02-27
Payer: COMMERCIAL

## 2023-02-27 VITALS
SYSTOLIC BLOOD PRESSURE: 108 MMHG | DIASTOLIC BLOOD PRESSURE: 70 MMHG | HEART RATE: 79 BPM | TEMPERATURE: 98.4 F | BODY MASS INDEX: 25.23 KG/M2 | OXYGEN SATURATION: 99 % | HEIGHT: 58 IN | WEIGHT: 120.2 LBS | RESPIRATION RATE: 20 BRPM

## 2023-02-27 DIAGNOSIS — Z12.4 SCREENING FOR CERVICAL CANCER: ICD-10-CM

## 2023-02-27 DIAGNOSIS — Z00.00 ROUTINE GENERAL MEDICAL EXAMINATION AT A HEALTH CARE FACILITY: ICD-10-CM

## 2023-02-27 DIAGNOSIS — Z00.00 ANNUAL PHYSICAL EXAM: Primary | ICD-10-CM

## 2023-02-27 LAB
CHOLEST SERPL-MCNC: 255 MG/DL
HDLC SERPL-MCNC: 74 MG/DL
LDLC SERPL CALC-MCNC: 144 MG/DL
NONHDLC SERPL-MCNC: 181 MG/DL
TRIGL SERPL-MCNC: 185 MG/DL

## 2023-02-27 PROCEDURE — 80061 LIPID PANEL: CPT

## 2023-02-27 PROCEDURE — G0145 SCR C/V CYTO,THINLAYER,RESCR: HCPCS

## 2023-02-27 PROCEDURE — 99396 PREV VISIT EST AGE 40-64: CPT | Mod: GC

## 2023-02-27 PROCEDURE — 36415 COLL VENOUS BLD VENIPUNCTURE: CPT

## 2023-02-27 PROCEDURE — 87624 HPV HI-RISK TYP POOLED RSLT: CPT

## 2023-02-27 ASSESSMENT — ENCOUNTER SYMPTOMS
NAUSEA: 0
HEADACHES: 0
DIARRHEA: 0
ARTHRALGIAS: 0
FREQUENCY: 0
PALPITATIONS: 0
BREAST MASS: 0
CHILLS: 0
JOINT SWELLING: 0
CONSTIPATION: 0
PARESTHESIAS: 0
DIZZINESS: 0
FEVER: 0
SHORTNESS OF BREATH: 0
SORE THROAT: 0
HEMATOCHEZIA: 0
WEAKNESS: 0
NERVOUS/ANXIOUS: 0
HEMATURIA: 0
MYALGIAS: 0
ABDOMINAL PAIN: 0
EYE PAIN: 0
COUGH: 0
HEARTBURN: 0
DYSURIA: 0

## 2023-02-27 NOTE — PROGRESS NOTES
"   SUBJECTIVE:   HUMAIRA: May is an 48 year old who presents for preventive health visit.   Patient has been advised of split billing requirements and indicates understanding: Yes  HPI  Ability to successfully perform activities of daily living: Yes, no assistance needed  Home safety:  none identified   Hearing impairment: Yes, No concerns    Annual Wellness Visit  Patient has been advised of split billing requirements and indicates understanding: Yes     Are you in the first 12 months of your Medicare Part B coverage?  No    Physical Health:    In general, how would you rate your overall physical health? good    Outside of work, how many days during the week do you exercise?none    Outside of work, approximately how many minutes a day do you exercise?not applicable    If you drink alcohol do you typically have >3 drinks per day or >7 drinks per week? Not Applicable    Do you usually eat at least 4 servings of fruit and vegetables a day, include whole grains & fiber and avoid regularly eating high fat or \"junk\" foods? Yes    Do you have any problems taking medications regularly? No    Do you have any side effects from medications? none    Needs assistance for the following daily activities: no assistance needed    Which of the following safety concerns are present in your home?  none identified     Hearing impairment: No    In the past 6 months, have you been bothered by leaking of urine? no    Mental Health:    In general, how would you rate your overall mental or emotional health? excellent  PHQ-2 Score: 0    Do you feel safe in your environment? Yes    Have you ever done Advance Care Planning? (For example, a Health Directive, POLST, or a discussion with a medical provider or your loved ones about your wishes)? No, advance care planning information given to patient to review.  Patient plans to discuss their wishes with loved ones or provider.      Fall risk:  Fall Risk Assessment not completed.  click delete button to " remove this line now    Do you have sleep apnea, excessive snoring or daytime drowsiness?: no    Current providers sharing in care for this patient include:   Patient Care Team:  Terri Ndiaye MD as PCP - General (Student in organized health care education/training program)  Terri Ndiaye MD as Assigned PCP    Patient has been advised of split billing requirements and indicates understanding: Yes    Today's PHQ-2 Score:   PHQ-2 ( 1999 [employer]) 2/27/2023   Q1: Little interest or pleasure in doing things 0   Q2: Feeling down, depressed or hopeless 0   PHQ-2 Score 0   Q1: Little interest or pleasure in doing things Not at all   Q2: Feeling down, depressed or hopeless Not at all   PHQ-2 Score 0       Have you ever done Advance Care Planning? (For example, a Health Directive, POLST, or a discussion with a medical provider or your loved ones about your wishes): No, advance care planning information given to patient to review.  Patient plans to discuss their wishes with loved ones or provider.      Social History     Tobacco Use     Smoking status: Never     Smokeless tobacco: Never   Substance Use Topics     Alcohol use: No     If you drink alcohol do you typically have >3 drinks per day or >7 drinks per week? Not applicable    Alcohol Use 2/27/2023   Prescreen: >3 drinks/day or >7 drinks/week? Not Applicable   No flowsheet data found.    Reviewed orders with patient.  Reviewed health maintenance and updated orders accordingly - Yes  Lab work is in process    Breast Cancer Screening:  Any new diagnosis of family breast, ovarian, or bowel cancer? No    FHS-7: No flowsheet data found.  click delete button to remove this line now  Mammogram Screening: Recommended annual mammography  Pertinent mammograms are reviewed under the imaging tab.    History of abnormal Pap smear: NO - age 30-65 PAP every 5 years with negative HPV co-testing recommended  PAP / HPV Latest Ref Rng & Units 1/25/2018   HPV16 NEG  Negative   HPV18 NEG Negative   HRHPV NEG Negative     Reviewed and updated as needed this visit by clinical staff   Tobacco  Allergies  Meds              Reviewed and updated as needed this visit by Provider                 No past medical history on file.   Past Surgical History:   Procedure Laterality Date     LAPAROSCOPY DIAGNOSTIC (GENERAL) N/A 12/22/2015    Procedure: LAPAROSCOPY, BILATERAL SALPINGECTOMY, RIGHT ECTOPIC;  Surgeon: Dylan Aguilera MD;  Location: [facility] [place];  Service:      TUBAL LIGATION       OB History   No obstetric history on file.       Review of Systems  CONSTITUTIONAL: NEGATIVE for fever, chills, change in weight  INTEGUMENTARU/SKIN: NEGATIVE for worrisome rashes, moles or lesions  EYES: NEGATIVE for vision changes or irritation  ENT: NEGATIVE for ear, mouth and throat problems  RESP: NEGATIVE for significant cough or SOB  BREAST: NEGATIVE for masses, tenderness or discharge  CV: NEGATIVE for chest pain, palpitations or peripheral edema  GI: NEGATIVE for nausea, abdominal pain, heartburn, or change in bowel habits  : NEGATIVE for unusual urinary or vaginal symptoms. Periods are regular.  MUSCULOSKELETAL: NEGATIVE for significant arthralgias or myalgia  NEURO: NEGATIVE for weakness, dizziness or paresthesias  PSYCHIATRIC: NEGATIVE for changes in mood or affect     OBJECTIVE:   There were no vitals taken for this visit.  Physical Exam  GENERAL: healthy, alert and no distress  EYES: Eyes grossly normal to inspection, PERRL and conjunctivae and sclerae normal  HENT: ear canals and TM's normal, nose and mouth without ulcers or lesions  NECK: no adenopathy, no asymmetry, masses, or scars and thyroid normal to palpation  RESP: lungs clear to auscultation - no rales, rhonchi or wheezes  CV: regular rate and rhythm, normal S1 S2, no S3 or S4, no murmur, click or rub, no peripheral edema and peripheral pulses strong  ABDOMEN: soft, nontender, no hepatosplenomegaly, no masses and bowel  sounds normal  MS: no gross musculoskeletal defects noted, no edema  SKIN: no suspicious lesions or rashes  NEURO: Normal strength and tone, mentation intact and speech normal  PSYCH: mentation appears normal, affect normal/bright    Diagnostic Test Results:  Labs reviewed in Epic  Lipids pending    ASSESSMENT/PLAN:   (Z00.00) Annual physical exam  (primary encounter diagnosis)  Comment: Doing well. Discussed increasing physical activity. She declined COVID, Flu, TDAP. She declined FIT/Colonoscopy for cancer screening and will think about it. Discussed Mammogram at age 52 since she is not high risk. She also declined HIV/Hep C screening.   Plan: Lipid panel reflex to direct LDL Fasting    (Z12.4) Screening for cervical cancer  Comment: Denies hx of abnormal PAP.   Plan: Gynecologic Cytology (PAP) with HPV      Patient has been advised of split billing requirements and indicates understanding: Yes      COUNSELING:  Reviewed preventive health counseling, as reflected in patient instructions       Regular exercise       Healthy diet/nutrition       Vision screening       Immunizations    Declined: Covid-19, Influenza and TDAP due to Concerns about side effects/safety               Alcohol Use       Safe sex practices/STD prevention       Colorectal Cancer Screening       Consider Hep C screening for all patients one time for ages 18-79 years       HIV screeninx in teen years, 1x in adult years, and at intervals if high risk        She reports that she has never smoked. She has never used smokeless tobacco.    I precepted today with Dr Potter.     Starr Santiago MD  [facility]

## 2023-02-27 NOTE — PROGRESS NOTES
Preceptor Attestation:    I discussed the patient with the resident and evaluated the patient in person. I have verified the content of the note, which accurately reflects my assessment of the patient and the plan of care.   Supervising Physician:  Wesley Potter MD.

## 2023-02-27 NOTE — LETTER
March 6, 2023      Shwana Otto  [address] [facility] [place] [address]        Dear ,    We are writing to inform you of your test results.    Your PAP smear was normal. You will be due for another exam in 5 years.     Resulted Orders   Gynecologic Cytology (PAP)   Result Value Ref Range    Interpretation        Negative for Intraepithelial Lesion or Malignancy (NILM)    Comment         Papanicolaou Test Limitations:  Cervical cytology is a screening test with limited sensitivity, and regular screening is critical for cancer prevention.  Pap tests are primarily effective for the diagnosis/prevention of squamous cell carcinoma, not adenocarcinoma or other cancers.        Specimen Adequacy       Satisfactory for evaluation, endocervical/transformation zone component absent    Clinical Information       none      Reflex Testing Yes regardless of result     Previous Abnormal?       No      Previous Abnormal Diagnosis       ASCUS previously      Performing Labs       The technical component of this testing was completed at [facility]     Lipid panel reflex to direct LDL Fasting   Result Value Ref Range    Cholesterol 255 (H) <200 mg/dL    Triglycerides 185 (H) <150 mg/dL    Direct Measure HDL 74 >=50 mg/dL    LDL Cholesterol Calculated 144 (H) <=100 mg/dL    Non HDL Cholesterol 181 (H) <130 mg/dL    Narrative    Cholesterol  Desirable:  <200 mg/dL    Triglycerides  Normal:  Less than 150 mg/dL  Borderline High:  150-199 mg/dL  High:  200-499 mg/dL  Very High:  Greater than or equal to 500 mg/dL    Direct Measure HDL  Female:  Greater than or equal to 50 mg/dL   Male:  Greater than or equal to 40 mg/dL    LDL Cholesterol  Desirable:  <100mg/dL  Above Desirable:  100-129 mg/dL   Borderline High:  130-159 mg/dL   High:  160-189 mg/dL   Very High:  >= 190 mg/dL    Non HDL Cholesterol  Desirable:  130 mg/dL  Above Desirable:  130-159 mg/dL  Borderline High:   160-189 mg/dL  High:  190-219 mg/dL  Very High:  Greater than or equal to 220 mg/dL   HPV High Risk Types DNA Cervical   Result Value Ref Range    Other HR HPV Negative Negative    HPV16 DNA Negative Negative    HPV18 DNA Negative Negative    FINAL DIAGNOSIS       This patient's sample is negative for HPV DNA.        This test was developed and its performance characteristics determined by the [facility]. It has not been cleared or approved by the FDA. The laboratory is regulated under CLIA as qualified to perform high-complexity testing. This test is used for clinical purposes. It should not be regarded as investigational or for research.    METHODOLOGY: The Roche Adriane 4800 system uses automated extraction, simultaneous amplification of HPV (L1 region) and beta-globin, followed by real time detection of fluorescent labeled HPV and beta globin using specific oligonucleotide probes. The test specifically identifies types HPV 16 DNA and HPV 18 DNA while concurrently detecting the rest of the high risk types (31, 33, 35, 39, 45, 51, 52, 56, 58, 59, 66 or 68).    COMMENTS: This test is not intended for use as a screening device for woman under age 30 with normal cervical cytology. Results should be correlated with cytologic and histologic findings. Close clinical followup is recommended.           If you have any questions or concerns, please call the clinic at the number listed above.       Sincerely,      Cindy Santiago MD

## 2023-03-01 LAB
BKR LAB AP GYN ADEQUACY: NORMAL
BKR LAB AP GYN INTERPRETATION: NORMAL
BKR LAB AP HPV REFLEX: NORMAL
BKR LAB AP PREVIOUS ABNL DX: NORMAL
BKR LAB AP PREVIOUS ABNORMAL: NORMAL
PATH REPORT.COMMENTS IMP SPEC: NORMAL
PATH REPORT.COMMENTS IMP SPEC: NORMAL
PATH REPORT.RELEVANT HX SPEC: NORMAL

## 2023-03-03 LAB
HUMAN PAPILLOMA VIRUS 16 DNA: NEGATIVE
HUMAN PAPILLOMA VIRUS 18 DNA: NEGATIVE
HUMAN PAPILLOMA VIRUS FINAL DIAGNOSIS: NORMAL
HUMAN PAPILLOMA VIRUS OTHER HR: NEGATIVE

## 2023-04-16 ENCOUNTER — HEALTH MAINTENANCE LETTER (OUTPATIENT)
Age: 48
End: 2023-04-16

## 2024-04-13 ENCOUNTER — HEALTH MAINTENANCE LETTER (OUTPATIENT)
Age: 49
End: 2024-04-13

## 2025-04-19 ENCOUNTER — HEALTH MAINTENANCE LETTER (OUTPATIENT)
Age: 50
End: 2025-04-19